# Patient Record
Sex: MALE | Race: WHITE | Employment: UNEMPLOYED | URBAN - NONMETROPOLITAN AREA
[De-identification: names, ages, dates, MRNs, and addresses within clinical notes are randomized per-mention and may not be internally consistent; named-entity substitution may affect disease eponyms.]

---

## 2017-08-02 ENCOUNTER — HOSPITAL ENCOUNTER (INPATIENT)
Age: 22
LOS: 5 days | Discharge: HOME | DRG: 751 | End: 2017-08-07
Attending: PSYCHIATRY & NEUROLOGY | Admitting: PSYCHIATRY & NEUROLOGY
Payer: MEDICAID

## 2017-08-02 DIAGNOSIS — R45.851 SUICIDAL IDEATION: Primary | ICD-10-CM

## 2017-08-02 LAB
ACETAMINOPHEN LEVEL: < 5 UG/ML (ref 0–20)
ALBUMIN SERPL-MCNC: 4.5 G/DL (ref 3.5–5.1)
ALP BLD-CCNC: 48 U/L (ref 38–126)
ALT SERPL-CCNC: 10 U/L (ref 11–66)
AMPHETAMINE+METHAMPHETAMINE URINE SCREEN: NEGATIVE
ANION GAP SERPL CALCULATED.3IONS-SCNC: 12 MEQ/L (ref 8–16)
AST SERPL-CCNC: 17 U/L (ref 5–40)
BACTERIA: NORMAL
BARBITURATE QUANTITATIVE URINE: NEGATIVE
BASOPHILS # BLD: 0.4 %
BASOPHILS ABSOLUTE: 0 THOU/MM3 (ref 0–0.1)
BENZODIAZEPINE QUANTITATIVE URINE: NEGATIVE
BILIRUB SERPL-MCNC: 0.4 MG/DL (ref 0.3–1.2)
BILIRUBIN DIRECT: < 0.2 MG/DL (ref 0–0.3)
BILIRUBIN URINE: NEGATIVE
BLOOD, URINE: NEGATIVE
BUN BLDV-MCNC: 11 MG/DL (ref 7–22)
CALCIUM SERPL-MCNC: 9.5 MG/DL (ref 8.5–10.5)
CANNABINOID QUANTITATIVE URINE: NORMAL
CASTS: NORMAL /LPF
CASTS: NORMAL /LPF
CHARACTER, URINE: CLEAR
CHLORIDE BLD-SCNC: 104 MEQ/L (ref 98–111)
CO2: 26 MEQ/L (ref 23–33)
COCAINE METABOLITE QUANTITATIVE URINE: NEGATIVE
COLOR: YELLOW
CREAT SERPL-MCNC: 0.9 MG/DL (ref 0.4–1.2)
CRYSTALS: NORMAL
EOSINOPHIL # BLD: 0.5 %
EOSINOPHILS ABSOLUTE: 0 THOU/MM3 (ref 0–0.4)
EPITHELIAL CELLS, UA: NORMAL /HPF
ETHYL ALCOHOL, SERUM: < 0.01 %
GFR SERPL CREATININE-BSD FRML MDRD: > 90 ML/MIN/1.73M2
GLUCOSE BLD-MCNC: 99 MG/DL (ref 70–108)
GLUCOSE, URINE: NEGATIVE MG/DL
HCT VFR BLD CALC: 43.8 % (ref 42–52)
HEMOGLOBIN: 15.1 GM/DL (ref 14–18)
KETONES, URINE: NEGATIVE
LEUKOCYTE ESTERASE, URINE: NEGATIVE
LYMPHOCYTES # BLD: 23.9 %
LYMPHOCYTES ABSOLUTE: 1.9 THOU/MM3 (ref 1–4.8)
MCH RBC QN AUTO: 29.4 PG (ref 27–31)
MCHC RBC AUTO-ENTMCNC: 34.4 GM/DL (ref 33–37)
MCV RBC AUTO: 85.7 FL (ref 80–94)
MISCELLANEOUS LAB TEST RESULT: NORMAL
MONOCYTES # BLD: 7.3 %
MONOCYTES ABSOLUTE: 0.6 THOU/MM3 (ref 0.4–1.3)
NITRITE, URINE: NEGATIVE
NUCLEATED RED BLOOD CELLS: 0 /100 WBC
OPIATES, URINE: NEGATIVE
OSMOLALITY CALCULATION: 282.5 MOSMOL/KG (ref 275–300)
OXYCODONE: NEGATIVE
PDW BLD-RTO: 14.1 % (ref 11.5–14.5)
PH UA: 5.5
PHENCYCLIDINE QUANTITATIVE URINE: NEGATIVE
PLATELET # BLD: 187 THOU/MM3 (ref 130–400)
PMV BLD AUTO: 8.8 MCM (ref 7.4–10.4)
POTASSIUM SERPL-SCNC: 4 MEQ/L (ref 3.5–5.2)
PROTEIN UA: NEGATIVE MG/DL
RBC # BLD: 5.12 MILL/MM3 (ref 4.7–6.1)
RBC # BLD: NORMAL 10*6/UL
RBC URINE: NORMAL /HPF
RENAL EPITHELIAL, UA: NORMAL
SALICYLATE, SERUM: < 0.3 MG/DL (ref 2–10)
SEG NEUTROPHILS: 67.9 %
SEGMENTED NEUTROPHILS ABSOLUTE COUNT: 5.4 THOU/MM3 (ref 1.8–7.7)
SODIUM BLD-SCNC: 142 MEQ/L (ref 135–145)
SPECIFIC GRAVITY UA: 1.02 (ref 1–1.03)
TOTAL PROTEIN: 6.9 G/DL (ref 6.1–8)
TSH SERPL DL<=0.05 MIU/L-ACNC: 1.75 UIU/ML (ref 0.4–4.2)
UROBILINOGEN, URINE: 0.2 EU/DL
WBC # BLD: 8 THOU/MM3 (ref 4.8–10.8)
WBC UA: NORMAL /HPF
YEAST: NORMAL

## 2017-08-02 PROCEDURE — G0480 DRUG TEST DEF 1-7 CLASSES: HCPCS

## 2017-08-02 PROCEDURE — 80053 COMPREHEN METABOLIC PANEL: CPT

## 2017-08-02 PROCEDURE — 84443 ASSAY THYROID STIM HORMONE: CPT

## 2017-08-02 PROCEDURE — 6370000000 HC RX 637 (ALT 250 FOR IP): Performed by: PSYCHIATRY & NEUROLOGY

## 2017-08-02 PROCEDURE — 81001 URINALYSIS AUTO W/SCOPE: CPT

## 2017-08-02 PROCEDURE — 99285 EMERGENCY DEPT VISIT HI MDM: CPT

## 2017-08-02 PROCEDURE — 82248 BILIRUBIN DIRECT: CPT

## 2017-08-02 PROCEDURE — 85025 COMPLETE CBC W/AUTO DIFF WBC: CPT

## 2017-08-02 PROCEDURE — 80320 DRUG SCREEN QUANTALCOHOLS: CPT

## 2017-08-02 PROCEDURE — 80305 DRUG TEST PRSMV DIR OPT OBS: CPT

## 2017-08-02 PROCEDURE — 1240000000 HC EMOTIONAL WELLNESS R&B

## 2017-08-02 PROCEDURE — 80329 ANALGESICS NON-OPIOID 1 OR 2: CPT

## 2017-08-02 PROCEDURE — 36415 COLL VENOUS BLD VENIPUNCTURE: CPT

## 2017-08-02 RX ORDER — NICOTINE 21 MG/24HR
1 PATCH, TRANSDERMAL 24 HOURS TRANSDERMAL EVERY 24 HOURS
Status: DISCONTINUED | OUTPATIENT
Start: 2017-08-02 | End: 2017-08-07 | Stop reason: HOSPADM

## 2017-08-02 RX ORDER — MAGNESIUM HYDROXIDE/ALUMINUM HYDROXICE/SIMETHICONE 120; 1200; 1200 MG/30ML; MG/30ML; MG/30ML
30 SUSPENSION ORAL PRN
Status: DISCONTINUED | OUTPATIENT
Start: 2017-08-02 | End: 2017-08-07 | Stop reason: HOSPADM

## 2017-08-02 RX ORDER — ACETAMINOPHEN 325 MG/1
650 TABLET ORAL EVERY 4 HOURS PRN
Status: DISCONTINUED | OUTPATIENT
Start: 2017-08-02 | End: 2017-08-07 | Stop reason: HOSPADM

## 2017-08-02 RX ORDER — HYDROXYZINE PAMOATE 25 MG/1
25 CAPSULE ORAL 3 TIMES DAILY PRN
Status: DISCONTINUED | OUTPATIENT
Start: 2017-08-02 | End: 2017-08-07 | Stop reason: HOSPADM

## 2017-08-02 RX ORDER — TRAZODONE HYDROCHLORIDE 50 MG/1
50 TABLET ORAL NIGHTLY PRN
Status: DISCONTINUED | OUTPATIENT
Start: 2017-08-02 | End: 2017-08-07 | Stop reason: HOSPADM

## 2017-08-02 ASSESSMENT — PAIN SCALES - GENERAL
PAINLEVEL_OUTOF10: 0
PAINLEVEL_OUTOF10: 0

## 2017-08-02 ASSESSMENT — ENCOUNTER SYMPTOMS
CHEST TIGHTNESS: 0
NAUSEA: 0
SHORTNESS OF BREATH: 0
ABDOMINAL PAIN: 0
VOMITING: 0

## 2017-08-02 ASSESSMENT — SLEEP AND FATIGUE QUESTIONNAIRES
DO YOU USE A SLEEP AID: NO
AVERAGE NUMBER OF SLEEP HOURS: 8
DO YOU HAVE DIFFICULTY SLEEPING: NO

## 2017-08-02 ASSESSMENT — LIFESTYLE VARIABLES: HISTORY_ALCOHOL_USE: NO

## 2017-08-02 NOTE — ED PROVIDER NOTES
[facility] 4E      CHIEF COMPLAINT       Chief Complaint   Patient presents with    Mental Health Problem       Nurses Notes reviewed and I agree except as noted in the HPI. HISTORY OF PRESENT ILLNESS    Maddie Batres is a 23 y.o. male who presents to the ED for suicidal ideation. Keily Marte is a student at [employer]. He [place] [facility] [place] His girlfriend of just over a year attends the same college. He reports that he has trust issues with their relationship and he has been upset that she has been speaking with another male student. Today he locked himself in his dorm room. He was thinking about hanging himself. He states he was going to hang himself with the cord of his bench grinder, attaching it to his bunk bed. His girlfriend unlocked his door and alerted campus security of his thoughts and the EMS was subsequently alerted. REVIEW OF SYSTEMS     Review of Systems   Constitutional: Negative for activity change, appetite change, fatigue, fever and unexpected weight change. Respiratory: Negative for chest tightness and shortness of breath. Cardiovascular: Negative for chest pain. Gastrointestinal: Negative for abdominal pain, nausea and vomiting. Genitourinary: Negative for dysuria. Allergic/Immunologic: Negative for immunocompromised state. Neurological: Negative for dizziness, light-headedness, numbness and headaches. Hematological: Does not bruise/bleed easily. Psychiatric/Behavioral: Positive for sleep disturbance (states only has been sleeping 3-4 hours a night. States racing thoughts at night) and suicidal ideas. Negative for agitation, behavioral problems, confusion and self-injury. The patient is not nervous/anxious. Suicidal thoughts related to relationship issues. States has attempted suicide once when he was 15. All other systems reviewed and are negative. PAST MEDICAL HISTORY    has no past medical history on file.     SURGICAL HISTORY has no past surgical history on file. CURRENT MEDICATIONS       There are no discharge medications for this patient. ALLERGIES     has No Known Allergies. FAMILY HISTORY     indicated that his mother is alive. He indicated that his father is alive. He indicated that his brother is alive. family history is not on file. SOCIAL HISTORY      reports that he has been smoking. He has a 9.00 pack-year smoking history. He does not have any smokeless tobacco history on file. He reports that he uses illicit drugs, including Marijuana. He reports that he does not drink alcohol. PHYSICAL EXAM     INITIAL VITALS:  height is 6' 1\" (1.854 m) and weight is 150 lb (68 kg). His temperature is 98.3 °F (36.8 °C). His blood pressure is 125/69 and his pulse is 78. His oxygen saturation is 96%. Physical Exam   Constitutional: He is oriented to person, place, and time. He appears well-developed and well-nourished. No distress. HENT:   Head: Normocephalic and atraumatic. Cardiovascular: Normal rate, regular rhythm, normal heart sounds and intact distal pulses. No murmur heard. Pulmonary/Chest: Effort normal. No respiratory distress. He has no wheezes. He has no rales. Abdominal: Soft. There is no tenderness. Musculoskeletal: Normal range of motion. Neurological: He is alert and oriented to person, place, and time. He has normal strength. Coordination normal.   Skin: Skin is warm and dry. He is not diaphoretic. Psychiatric: He has a normal mood and affect. His speech is normal and behavior is normal. Thought content is not paranoid and not delusional. Cognition and memory are normal. He expresses impulsivity. He expresses suicidal ideation. He expresses no homicidal ideation. He expresses no suicidal plans and no homicidal plans. States had thoughts of suicide prior to arrival. States he realizes there are other options than suicide for his emotional issues.    Nursing note and vitals reviewed. DIFFERENTIAL DIAGNOSIS:   Suicidal ideation, depressive disorder, situational reaction    DIAGNOSTIC RESULTS       RADIOLOGY: non-plain film images(s) such as CT, Ultrasound and MRI are read by the radiologist.  Plain radiographic images are visualized and preliminarily interpreted by the emergency physician unless otherwise stated below.   No orders to display       LABS:   Results for orders placed or performed during the hospital encounter of 08/02/17   Acetaminophen Level   Result Value Ref Range    Acetaminophen Level < 5.0 0.0 - 20.0 ug/mL   Basic Metabolic Panel   Result Value Ref Range    Sodium 142 135 - 145 meq/L    Potassium 4.0 3.5 - 5.2 meq/L    Chloride 104 98 - 111 meq/L    CO2 26 23 - 33 meq/L    Glucose 99 70 - 108 mg/dL    BUN 11 7 - 22 mg/dL    CREATININE 0.9 0.4 - 1.2 mg/dL    Calcium 9.5 8.5 - 10.5 mg/dL   CBC Auto Differential   Result Value Ref Range    WBC 8.0 4.8 - 10.8 thou/mm3    RBC 5.12 4.70 - 6.10 mill/mm3    Hemoglobin 15.1 14.0 - 18.0 gm/dl    Hematocrit 43.8 42.0 - 52.0 %    MCV 85.7 80.0 - 94.0 fL    MCH 29.4 27.0 - 31.0 pg    MCHC 34.4 33.0 - 37.0 gm/dl    RDW 14.1 11.5 - 14.5 %    Platelets 247 046 - 488 thou/mm3    MPV 8.8 7.4 - 10.4 mcm    RBC Morphology NORMAL     Seg Neutrophils 67.9 %    Lymphocytes 23.9 %    Monocytes 7.3 %    Eosinophils 0.5 %    Basophils 0.4 %    nRBC 0 /100 wbc    Segs Absolute 5.4 1.8 - 7.7 thou/mm3    Lymphocytes # 1.9 1.0 - 4.8 thou/mm3    Monocytes # 0.6 0.4 - 1.3 thou/mm3    Eosinophils # 0.0 0.0 - 0.4 thou/mm3    Basophils # 0.0 0.0 - 0.1 thou/mm3   Ethanol   Result Value Ref Range    ETHYL ALCOHOL, SERUM < 0.01 0.00 %   Urinalysis with Microscopic   Result Value Ref Range    Glucose, Urine NEGATIVE NEGATIVE mg/dl    Bilirubin Urine NEGATIVE NEGATIVE    Ketones, Urine NEGATIVE NEGATIVE    Specific Gravity, UA 1.024 1.002 - 1.03    Blood, Urine NEGATIVE NEGATIVE    pH, UA 5.5 5.0 - 9.0    Protein, UA NEGATIVE NEGATIVE mg/dl Urobilinogen, Urine 0.2 0.0 - 1.0 eu/dl    Nitrite, Urine NEGATIVE NEGATIVE    Leukocyte Esterase, Urine NEGATIVE NEGATIVE    Color, UA YELLOW YELLOW-STR    Character, Urine CLEAR CLR-SL.SHERRY    RBC, UA 0-2 0-2/hpf /hpf    WBC, UA 0-2 0-4/hpf /hpf    Epi Cells 0-2 3-5/hpf /hpf    Bacteria, UA NONE FEW/NONE S    Casts NONE SEEN NONE SEEN /lpf    Crystals NONE SEEN NONE SEEN    Renal Epithelial, Urine NONE SEEN NONE SEEN    Yeast, UA NONE SEEN NONE SEEN    Casts NONE SEEN /lpf    Miscellaneous Lab Test Result NONE SEEN    TSH without Reflex   Result Value Ref Range    TSH 1.750 0.400 - 3.34 uIU/mL   Salicylate   Result Value Ref Range    Salicylate, Serum < 0.3 (L) 2.0 - 10.0 mg/dL   Hepatic Function Panel   Result Value Ref Range    Alb 4.5 3.5 - 5.1 g/dL    Total Bilirubin 0.4 0.3 - 1.2 mg/dL    Bilirubin, Direct <0.2 0.0 - 0.3 mg/dL    Alkaline Phosphatase 48 38 - 126 U/L    AST 17 5 - 40 U/L    ALT 10 (L) 11 - 66 U/L    Total Protein 6.9 6.1 - 8.0 g/dL   Rapid drug screen, urine   Result Value Ref Range    AMPHETAMINE+METHAMPHETAMINE URINE SCREEN negative NEGATIVE    Barbiturate Quant, Ur negative NEGATIVE    Benzodiazepine Quant, Ur negative NEGATIVE    Cannabinoid Quant, Ur **POSITIVE** NEGATIVE    Cocaine Metab Quant, Ur negative NEGATIVE    Opiates, Urine negative NEGATIVE    PCP Quant, Ur negative NEGATIVE   Anion Gap   Result Value Ref Range    Anion Gap 12.0 8.0 - 16.0 meq/L   Glomerular Filtration Rate, Estimated   Result Value Ref Range    Est, Glom Filt Rate >90 ml/min/1.73m2   Osmolality   Result Value Ref Range    Osmolality Calc 282.5 275.0 - 300 mOsmol/kg       EMERGENCY DEPARTMENT COURSE:   Vitals:    Vitals:    08/02/17 1426   BP: 125/69   Pulse: 78   Temp: 98.3 °F (36.8 °C)   SpO2: 96%   Weight: 150 lb (68 kg)   Height: 6' 1\" (1.854 m)       MDM  The patient was evaluated per [employer] staff.  The case was discussed with psychiatry who decided to admit the patient for inpatient psychiatric treatment. Medications Given in the ED  Medications   acetaminophen (TYLENOL) tablet 650 mg (not administered)   hydrOXYzine (VISTARIL) capsule 25 mg (not administered)   traZODone (DESYREL) tablet 50 mg (not administered)   magnesium hydroxide (MILK OF MAGNESIA) 400 MG/5ML suspension 30 mL (not administered)   aluminum & magnesium hydroxide-simethicone (MAALOX) 200-200-20 MG/5ML suspension 30 mL (not administered)       CRITICAL CARE[de-identified]   NONE    CONSULTS:  LIYAH    PROCEDURES:  None    FINAL IMPRESSION      1.  Suicidal ideation          DISPOSITION/PLAN   Decision To Admit      (Please note that portions of this note were completed with a voice recognition program.  Efforts were made to edit the dictations but occasionally words are mis-transcribed.)      Osmar Couch PA-C 08/02/17 4:46 PM      Benyj Crump, [address] [place] [address]  08/02/17 5418

## 2017-08-02 NOTE — IP AVS SNAPSHOT
After Visit Summary    This summary was created for you. Thank you for entrusting your care to us. The following information includes details about your hospital/visit stay along with steps you should take to help with your recovery once you leave the hospital.  In this packet, you will find information about the topics listed below:    · Instructions about your medications including a list of your home medications  · A summary of your hospital visit  · Follow-up appointments once you have left the hospital  · Your care plan at home      You may receive a survey regarding the care you received during your stay. Your input is valuable to us. We encourage you to complete and return your survey in the envelope provided. We hope you will choose us in the future for your healthcare needs. Basic Information     Date Of Birth Sex Race Ethnicity       1995 Male White Non-/Non        Vital Signs     Blood Pressure Pulse Temperature Respirations Height Weight    135/63 78 96.9 °F (36.1 °C) (Oral) 20 6' 1\" (1.854 m) 149 lb (67.6 kg)    Oxygen Saturation Body Mass Index Smoking Status             98% 19.66 kg/m2 Current Every Day Smoker         Care Provided at:     Name Address Phone       [address] [phone]       Psychiatric Advance Directive          Most Recent Value    Psychiatric Advance Directive  No    Power of  for Alexis Salmeron  none           Your Visit    Here you will find information about your visit, including the reason for your visit. Please take this sheet with you when you visit your doctor or other health care provider in the future. It will help determine the best possible medical care for you at that time. If you have any questions once you leave the hospital, please call the department phone number listed below.         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 Phone    8/2/2017 Tr Bryan MD [facility] 4E [phone]       Follow-up Appointments    Below is a list of your follow-up and future appointments. This may not be a complete list as you may have made appointments directly with providers that we are not aware of or your providers may have made some for you. Please call your providers to confirm appointments. It is important to keep your appointments. Please bring your current insurance card, photo ID, co-pay, and all medication bottles to your appointment. If self-pay, payment is expected at the time of service. Follow-up Information     Schedule an appointment as soon as possible for a visit [facility]  [facility] .    Why:  Mike Kimberly is to call to schedule follow up with the [address] [place] [address]. Contact information:    Location:  [facility]  Hours:  Monday - Friday, 9:00 a.m. - 5:00 p.m. Contact Information:  Emigdio Troy Psy.D. - Director of [address] [place] [address]  [phone]  [email or web address]             Care Plan Once You Return Home    This section includes instructions you will need to follow once you leave the hospital.  Your care team will discuss these with you, so you and those caring for you know how to best care for your health needs at home. This section may also include educational information about certain health topics that may be of help to you. Activity Instructions     As tolerated             Discharge Instructions       Keep all follow-up appointments and take medications as directed. Call the hope line if needed at :  Sangita Flores, and .S. formerly [facility] [phone]. Honey Iqbal [phone]. [facility] [phone]. [address] [place] [phone]. Salud Swartz, and Og [phone]    Symptoms to report to your Doctor:  Depression  Inability to eat, sleep, or have a bowel movement  Increased sleepiness and lethargy  Voices in your head  Any thoughts of harming self or others    Things to avoid:  Caffeine  Alcohol  No street drugs  Over the counter medications unless Ok'd by your physician or pharmacist.  Driving or operating machinery until full effects of your medications are known. Driving or operating machinery if dizzy or drowsy from medications. Use journal as directed. Education:  Illness and medication teaching was completed. Discharge Disposition: Patient was discharged to dorm apartment and was transported by private. Patient was accompanied by girlfriend. Information sent to next level of care:    ____Admission orders to [facility] [address]    ____Discharge instructions    ____Behavioral Services Assessment    ____Hand off Summary    ____History and Physical    ____Last dose MAR    ____Patient transfer form    ____Other           Important Information    If your condition worsens or if you have any concerns, call your doctor or seek emergency medical services (dial 9-1-1) as needed. If you have any of the following symptoms/conditions, call your doctor. Call your primary care physician to obtain results of outstanding lab tests, cultures, x-rays, or other tests. Important Information if you smoke or are exposed to smoking       SMOKING: QUIT SMOKING. THIS IS THE MOST IMPORTANT ACTION YOU CAN TAKE TO IMPROVE YOUR CURRENT AND FUTURE HEALTH. Call the [facility] NOW ([phone])    Smoking harms nonsmokers. When nonsmokers are around people who smoke, they absorb nicotine, carbon monoxide, and other ingredients of tobacco smoke.      DO NOT SMOKE AROUND CHILDREN Immunizations as of 8/7/2017  Reviewed on 8/2/2017    No immunizations on file. MyChart Signup     [facility] allows you to send messages to your doctor, view your test results, renew your prescriptions, schedule appointments, view visit notes, and more. How Do I Sign Up? 1. In your Internet browser, go to [email or web address]  2. Click on the Sign Up Now link in the Sign In box. You will see the New Member Sign Up page. 3. Enter your [facility] Access Code exactly as it appears below. You will not need to use this code after youve completed the sign-up process. If you do not sign up before the expiration date, you must request a new code. [facility] Access Code: [email or web address]  Expires: 10/6/2017  1:56 PM    4. Enter your Social Security Number ([id number]) and Date of Birth (mm/dd/yyyy) as indicated and click Submit. You will be taken to the next sign-up page. 5. Create a [facility] ID. This will be your [facility] login ID and cannot be changed, so think of one that is secure and easy to remember. 6. Create a [facility] password. You can change your password at any time. 7. Enter your Password Reset Question and Answer. This can be used at a later time if you forget your password. 8. Enter your e-mail address. You will receive e-mail notification when new information is available in [address] 9. Click Sign Up. You can now view your medical record. Additional Information  If you have questions, please contact the physician practice where you receive care. Remember, [facility] is NOT to be used for urgent needs. For medical emergencies, dial 911. For questions regarding your [facility] account call [phone]. If you have a clinical question, please call your doctor's office. View your information online  ? Review your current list of  medications, immunization, and allergies. ? Review your future test results online . ? Review your discharge instructions provided by your caregivers at discharge    Certain functionality such as prescription refills, scheduling appointments or sending messages to your provider are not activated if your provider does not use [facility] in his/her office    For questions regarding your [facility] account call [phone]. If you have a clinical question, please call your doctor's office.

## 2017-08-02 NOTE — PROGRESS NOTES
[address] [facility]  Admission Note     Admission Type:   Admission Type: Involuntary    Reason for admission:  Reason for Admission: MDD    PATIENT STRENGTHS:  Strengths: Communication, Social Skills, No significant Physical Illness    Patient Strengths and Limitations:  Limitations: Unrealistic self-view, Apathetic / unmotivated    Addictive Behavior:   Addictive Behavior  In the past 3 months, have you felt or has someone told you that you have a problem with:  : None  Do you have a history of Chemical Use?: No  Do you have a history of Alcohol Use?: No  Do you have a history of Street Drug Abuse?: Yes  Histroy of Prescripton Drug Abuse?: No    Medical Problems:   History reviewed. No pertinent past medical history. Status EXAM:  Status and Exam  Normal: No  Facial Expression: Exaggerated, Worried  Affect: Unstable  Level of Consciousness: Alert  Mood:Normal: No  Mood: Anxious, Suspicious, Angry  Motor Activity:Normal: No  Motor Activity: Increased  Interview Behavior: Cooperative, Evasive, Irritable  Preception: [place] to Person, Myrla Lighter to Time, [place] to Place, [place] to Situation  Attention:Normal: No  Attention: Unable to Concentrate  Thought Processes: Tangential  Thought Content:Normal: Yes  Hallucinations: None  Delusions: No  Memory:Normal: Yes  Insight and Judgment: No  Insight and Judgment: Poor Judgment, Poor Insight, Unrealistic  Present Suicidal Ideation: No  Present Homicidal Ideation: No    Pt admitted with followings belongings:  Dentures: None  Vision - Corrective Lenses: None  Hearing Aid: None  Jewelry: None  Body Piercings Removed: N/A  Clothing: Footwear, Pants, Shirt  Were All Patient Medications Collected?: Not Applicable  Other Valuables: Cell phone, Pamela Salon, Money (Comment), Other (Comment) (cigarettes, lighter)     Valuables sent home with N/A. Valuables placed in safe in security envelope, number:  [id number]. Patient's home medications were N/A.   Patient oriented to surroundings and program expectations and copy of patient rights given. Received admission packet:  YES. Consents reviewed, signed YES. Refused NO. Patient verbalize understanding:  YES. Patient education on precautions: N/A           Provided pt with [employer] handout entitled \"Quitting Smoking. \"  Reviewed handout with pt addressing dangers of smoking, developing coping skills, and providing basic information about quitting. Pt response to counseling:  Not interested in quitting at this time          Doni Johnson RN       Patient came in by ambulance to the ED. Had conversation with girlfriend about pat in her class, upset and locked door to his bedroom. (police state that he barricaded himself in) Yelled that he wanted to die /give up and  heard him and called police. Patient states that he had no intention to hurt himself but he does have occasional thoughts. He is feeling stress from school and moving to [place]. He was tearful during assessment. Sleeps well at night, eating well, uses marijuana 4x/week. Has finals this week and is hoping for discharge.
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
## 2017-08-02 NOTE — Clinical Note
Patient Class: Inpatient [101]   REQUIRED: Diagnosis: Major depressive disorder with single episode [[id number]]   Estimated Length of Stay: Estimated stay of more than 2 midnights   Future Attending Provider: Ingrid Mackenzie [[id number]]   Admitting Provider: Ingrid Mackenzie [[id number]]   Telemetry Bed Required?: No

## 2017-08-02 NOTE — ED NOTES
Bed: 025A  Expected date: 8/2/17  Expected time:   Means of arrival:   Comments:  [address]  08/02/17 2384

## 2017-08-02 NOTE — ED NOTES
1415 Received notification that pt is high risk for harm to self or others. Attending ED provider assigned. Escorted pt to safe room area. Placed personal belongings in bag with pt present. Secured bag outside of safe room in locker. Pt escorted to bathroom . With bathroom door open, providing privacy as much as able, pt undressed to only underpants. Visual check/skin assessment performed. Pt given hospital attire with snaps. Clothing secured in bag outside of safe room. Pt belonging list completed via EPIC.  1425 Completed Broset tool with score of 0. Assessment of pt determines pt to currently be at high risk due to suicidal ideation with plan. .  See [facility] Purposeful Rounds sheet for 15 minute documentation. 1425 Pt seen by attending ED provider. [address] Call with Dr Stan Chowdhury. Admission orders received.               Ken Hamilton, RN  08/02/17 5292

## 2017-08-03 PROCEDURE — 1240000000 HC EMOTIONAL WELLNESS R&B

## 2017-08-03 PROCEDURE — 90792 PSYCH DIAG EVAL W/MED SRVCS: CPT | Performed by: PSYCHIATRY & NEUROLOGY

## 2017-08-03 PROCEDURE — 6370000000 HC RX 637 (ALT 250 FOR IP): Performed by: PSYCHIATRY & NEUROLOGY

## 2017-08-03 NOTE — BH NOTE
Group Therapy Note    Date: 8/3/2017  Start Time:  1100  End Time:   1200  Number of Participants: 9    Type of Group: Recreational/Social      Patient's Goal:   Increase concentration/socialization. Notes:   Social with others. Anxious to talk to the doctor about being discharged.     Status After Intervention:  Unchanged    Participation Level: Interactive    Participation Quality: Attentive and Sharing      Speech:  normal      Thought Process/Content: Logical      Affective Functioning: Congruent      Mood: euthymic      Level of consciousness:  Oriented x4      Response to Learning: Able to verbalize current knowledge/experience      Endings: None Reported    Modes of Intervention: Socialization and Activity      Discipline Responsible: Psychoeducational Specialist      Signature:  Elvis Severance

## 2017-08-03 NOTE — PROGRESS NOTES
Group Therapy Note    Date: 8/3/2017  Start Time: 1330  End Time:  1430  Number of Participants: 11    Type of Group: Cognitive skills group    Wellness Binder Information  Module Name:  Relationship group  Session Number: NA      Notes: Pt is present for group and continues with active participation. Relates well with his peers and demonstrates insight. Discussion around denial and uncovering thinking errors which contribute to minimization of life issues. Identification of daily practice of honesty, open mindedness and willingness and how those three skills support recovery. Status After Intervention:  Improved    Participation Level:  Active Listener and Interactive    Participation Quality: Appropriate, Attentive, Sharing and Supportive      Speech:  normal      Thought Process/Content: Logical  Linear      Affective Functioning: Congruent      Mood: euthymic      Level of consciousness:  Alert, Oriented x4 and Attentive      Response to Learning: Able to verbalize current knowledge/experience, Able to verbalize/acknowledge new learning, Able to retain information, Capable of insight, Able to change behavior and Progressing to goal      Endings: None Reported    Modes of Intervention: Education, Support, Socialization, Exploration, Clarifying, Problem-solving and Activity      Discipline Responsible: /Counselor      Signature:  MARIPOSA Jackson

## 2017-08-03 NOTE — PROGRESS NOTES
This RN has reviewed and agrees with Mary De La Torre LPN's data collection and has collaborated with this LPN regarding the patient's care plan.
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
## 2017-08-03 NOTE — BH NOTE
INPATIENT RECREATIONAL THERAPY  ADULT BEHAVIORAL SERVICES  EVALUATION    REFERRING PHYSICIAN:   Dr. Keo Rivera  DIAGNOSIS:   Major Depressive Disorder  PRECAUTIONS:   Suicide precautions    HISTORY OF PRESENT ILLNESS/INJURY:  Patient was admitted to the unit due to suicidal ideation and depression. Patient reportedly was in his dorm room and threatened to hang himself with a cord prior to admission. Patient stated that he was jealous due to his girlfriend being friendly with another pat. Patient also reported stress due to his classes. Patient stated that he has finals this week and needs discharged. Patient was pleasant and cooperative at time of evaluation. PMH:  Please see medical chart for prior medical history, allergies, and medication    HISTORY OF PSYCHIATRIC TREATMENT:  None known but does have a history of an attempted hanging as a teen. DATE OF BIRTH:   [birth year]  GENDER:   male  MARITAL STATUS:  Patient has a girlfriend. EMPLOYMENT STATUS:   Patient is a college student at [employer]. LIVING SITUATION/SUPPORT:  Lives in the school dorms. EDUCATIONAL LEVEL:  Patient is currently a college student in the automotive [employer] program at [employer]. MEDICATION/DRUG USE:  Marijuana use. LEISURE INTERESTS:   Activities with his girlfriend, working on cars, activities with family, watching TV/Movies, activities with friends, listening to music, playing games and cards, computer activities  ACTIVITY PREFERENCE:  No preference  ACTIVITY TYPES:   Passive. Active. Indoor. Outdoor. COGNITION:  A&Ox4    COPING:  poor  ATTENTION:  fair  RELAXATION:  Reported poor sleep, anxiety and racing thoughts. SELF-ESTEEM:  fair  MOTIVATION:   good    SOCIAL SKILLS:  good  FRUSTRATION TOLERANCE:   Patient has a history of cutting.   ATTENTION SEEKING:  None noted  COOPERATION:  Cooperative and pleasant  AFFECT:  bright  APPEARANCE:  Appropriate/neat    HEARING:  No problems  VISION:  No problems  VERBAL COMMUNICATION:   No problems  WRITTEN COMMUNICATION:   No problems    COORDINATION:   No problems noted   MOBILITY:   Ambulates independently   GOALS:   Increase socialization by attending at least 3 groups on the unit daily.

## 2017-08-03 NOTE — PROGRESS NOTES
[facility]  Initial Interdisciplinary Treatment Plan NOTE    Review Date & Time: 08/03/17 6712    Patient was in treatment team    Admission Type:   Admission Type: Involuntary    Reason for admission:  Reason for Admission: MDD      Estimated Length of Stay Update:  0/05/17  Estimated Discharge Date Update: 08/07/17    PATIENT STRENGTHS:  Patient Strengths Strengths: Communication, Social Skills, No significant Physical Illness  Patient Strengths and Limitations:Limitations: Unrealistic self-view, Apathetic / unmotivated  Addictive Behavior:Addictive Behavior  In the past 3 months, have you felt or has someone told you that you have a problem with:  : None  Do you have a history of Chemical Use?: No  Do you have a history of Alcohol Use?: No  Do you have a history of Street Drug Abuse?: Yes  Histroy of Prescripton Drug Abuse?: No  Medical Problems:History reviewed. No pertinent past medical history. EDUCATION:   Learner Progress Toward Treatment Goals: Reviewed results and recommendations of this team, Reviewed group plan and strategies, Reviewed signs, symptoms and risk of self harm and violent behavior and Reviewed goals and plan of care    Method: Small group    Outcome: Verbalized understanding and Demonstrated Understanding    PATIENT GOALS: See below    PLAN/TREATMENT RECOMMENDATIONS UPDATE:Medication management, supportive therapy, discharge planning. SHORT-TERM GOALS UPDATE:   Time frame for Short-Term Goals: Daily    LONG-TERM GOALS UPDATE:   Time frame for Long-Term Goals: 08/07/17  Members Present in Team Meeting: See Signature Sheet      MARIPOSA Calhoun    1. What is the most important thing that you need help with while you are here? Improve coping skills. 2. Who is your support system? Family, friends  3. Where will you be residing when you leave the hospital? Dorm room  4. Do you have follow-up providers? Intends to follow with Counseling dept.  At [facility]

## 2017-08-03 NOTE — PLAN OF CARE
Problem: Social interaction  Goal: Increased social interaction  Outcome: Met This Shift  Patient has attended at least 3 groups this shift so he has met his socialization goal for today.

## 2017-08-03 NOTE — PLAN OF CARE
Problem: Health Behavior:  Goal: Ability to verbalize adaptive coping strategies to use when suicidal feelings occur will improve  Ability to verbalize adaptive coping strategies to use when suicidal feelings occur will improve   Outcome: Completed Date Met:  08/03/17  Denies thoughts to harm self    Problem: Safety:  Goal: Ability to contract for his/her safety will improve  Ability to contract for his/her safety will improve   Outcome: Completed Date Met:  08/03/17  Able to contract for safety    Problem: Altered Mood, Depressive Behavior  Goal: LTG-Able to verbalize acceptance of life and situations over which he or she has no control  Outcome: Ongoing  Goal: LTG-Able to verbalize and/or display a decrease in depressive symptoms  Outcome: Ongoing  Denies feeling depressed \"I said a stupid thing. Thresa Abed Thresa Abed Thresa Abed \"   Goal: STG-Able to verbalize suicidal ideations  Outcome: Completed Date Met:  08/03/17  denies  Goal: STG-Able to verbalize support system  Outcome: Completed Date Met:  08/03/17  States has family, friends and mom for support  Goal: LTG-Absence of self-harm  Outcome: Met This Shift  Goal: STG-Knowledge of positive coping patterns  Outcome: Ongoing  Coped this pm by playing cards and socializing with peers  Goal: Patient Specific Goal  Outcome: Ongoing  No goal set for the day as was admitted late in day. Goal: Participation in care planning  Outcome: Ongoing  Care plan reviewed with patient. Patient does verbalize understanding of the plan of care and did contribute to goal setting.     Problem: Falls - Risk of:  Goal: Will remain free from falls  Will remain free from falls   Outcome: Met This Shift    Problem: Discharge Planning:  Goal: Discharged to appropriate level of care  Discharged to appropriate level of care   Outcome: Ongoing    Problem: Substance Abuse  Goal: LTG-Absence of acute withdrawl symptoms  Outcome: Completed Date Met:  08/03/17  Denies symptoms of withdrawal.  Goal: STG-Knowledge of positive coping patterns  Outcome: Ongoing  Coped this pm by playing cards and socializing with peers    Problem: Anxiety:  Goal: Level of anxiety will decrease  Level of anxiety will decrease   Outcome: Completed Date Met:  08/03/17  Denies feeling anxious. Was tearful with girlfriend on phone just before going to bed.     Problem: KNOWLEDGE DEFICIT,EDUCATION,DISCHARGE PLAN  Goal: Knowledge - personal safety  Outcome: Ongoing

## 2017-08-03 NOTE — H&P
Department of Psychiatry  Attending Physician Psychiatric Assessment     CHIEF COMPLAINT:  SI    History obtained from:  patient, electronic medical record    HISTORY OF PRESENT ILLNESS:    Marcelo Francois is a 23 y.o. male, a college student who was brought in because of suicidal ideation, with intent and plan of hanging himself with a cord after argument with his GF    Pt stated that he as having conflicts with his GF as she was talking to a pat that he didn't like. He stated that he had an argument, after that he locked the door of the dorm, started yelling and screaming that he is going to kill himself. He stated that college staff called [phone], and he told 911 staff that he was thinking of hanging himself with a cord. He was pink slipped. Pt seemed to be minimizing his symptoms, he stated that what he did was \"out of anger\" and \"it was a stupid thing\" and he didn't intent to harm himself. He stated that for last few weeks he had been feeling stressed because of conflicts with his GF, stress at school and financial stress, though he denies any psychiatric symptoms including depressive, manic or anxiety symptoms, perceptual disturbances or suicidal/homicidal ideation, intent or plan. He was preoccupied with discharge, stating that he has learned \"lot of coping skills\" during his stay in the hospital. Treatment options including medications discussed with the pt, with benefits, risks and treatment alternatives in detail, he kept on refusing to take any meds and stating that he doesn't need them and will only follow with therapy. He denies any active physical complaints and is not in any apparent distress    PSYCHIATRIC HISTORY:    Pt stated that at the age of 15 yrs, he saw a therapist for one and half months for \"anger issues and depression\".  He denies any other psychiatric hx including taking any psychotropic medication, any prior  h/o psychiatric hospitalization, suciidal attempt or aggressive/homicidal behavior    Past psychiatric medications includes:     Pt denies taking any psychotropic medication    Adverse reactions from psychotropic medications:  N/A      Lifetime Psychiatric Review of Systems         Carolin or Hypomania:  Pt denies     Panic Attacks:  Pt denies     Phobias:  Pt denies     Obsessions and Compulsions:Pt denies     Body or Vocal Tics:  Pt denies     Hallucinations:  Pt denies     Delusions:  Pt denies    Past Medical History:    History reviewed. No pertinent past medical history. Past Surgical History:    History reviewed. No pertinent surgical history. Medications Prior to Admission:   No prescriptions prior to admission. Allergies:  Review of patient's allergies indicates no known allergies. Social History:  TOBACCO: smokes pack a day  ETOH:  Pt denies  DRUGS: smokes marijuana 3-4 times a wk, half joint  MARITAL STATUS: single  OCCUPATION: college student  RESIDENCE: Currently lives in college dorm        Family History:   History reviewed. No pertinent family history.     Psychiatric Family History  Pt denies any family h/o psychiatric illness      PHYSICAL EXAM:  Vitals:  /67  Pulse 83  Temp 96.7 °F (35.9 °C) (Oral)   Resp 16  Ht 6' 1\" (1.854 m)  Wt 149 lb (67.6 kg)  SpO2 98%  BMI 19.66 kg/m2    Physical exam performed in ED      MENTAL STATUS EXAM  Level of consciousness: AAOX3  Appearance:  Disheveled  Behavior/Motor:  No abnormalities noticed  Attitude toward examiner:  Partially cooperative, seems to be minimizing his symptoms  Speech:  Goal directed, linear  Mood:  \"fine\"  Affect:  Stable, full range  Thought processes: linear, goal directed  Thought content:  Denies suicidal/homicidal ideation, no evidence of suicidal/homicidal ideation or delusions found  Perceptual Disturbance:  Pt denies  Cognition:  Within normal limits  Memory: Good recent and remote memory  Insight & Judgement: partial  Medication side effects: Pt denies          DSM 5 DIAGNOSIS:    Major depressive disorder    Psychosocial and contextual factors: There is no problem list on file for this patient. TREATMENT PLAN    Risk Management:  close watch per standard protocol      Psychotherapy:  participation in milieu and group and individual sessions with Attending Physician,  and Physician Assistant/CNP    Reason for Admission to Psychiatric Unit: Pt was brought in for SI with intent and plan, he poses danger to self and others and needs inpatient hospitalization for stabilization      Estimated length of stay:  5-10 days      GENERAL PATIENT/FAMILY EDUCATION  Patient will understand basic signs and symptoms, Patient will understand benefits/risks and potential side effects from proposed meds and Patient will understand their role in recovery. Family is  active in patient's care. Patient assets that may be helpful during treatment include: Intent to participate and engage in treatment, sufficient fund of knowledge and intellect to understand and utilize treatments. Goals:    -Pt only wants to follow with therapy and refused to start any medication despite counseling in detail    -Psychoeducation/supportive therapy/group and milieu therapy      Behavioral Services  Medicare Certification Upon Admission    I certify that this patient's inpatient psychiatric hospital admission is medically necessary for:   X (1) Treatment which could reasonably be expected to improve this patient's condition,      X (2) Or for diagnostic study;     AND     X (2) The inpatient psychiatric services are provided while the individual is under the care of a physician and are included in the individualized plan of care. Estimated length of stay/service 5-7 days    Plan for post-hospital care : Psychiatry follow up after discharge       Physicians Signature:  Electronically signed by Renay Moya.  Mack Delgado MD, on 8/3/2017 at 12:29 PM

## 2017-08-03 NOTE — PROGRESS NOTES
Group Therapy Note    Date: 8/3/2017  Start Time: 1000  End Time:  1100  Number of Participants: 9    Type of Group: Psychotherapy    Wellness Binder Information  Module Name:  Relationship group  Session Number: NA      Notes: Pt is present for group  with active participation. Relates well with his peers. Pt is new to the unit and shared the events related to this admission. Supportive of peers. Status After Intervention:  Improved    Participation Level:  Active Listener and Interactive, monopolizing    Participation Quality: Appropriate, Attentive, Sharing and Supportive      Speech:  Rapid Pressured      Thought Process/Content: Logical  Linear      Affective Functioning: Congruent      Mood: Euphoric      Level of consciousness:  Alert, Oriented x4 and Attentive      Response to Learning: Able to verbalize current knowledge/experience, Able to verbalize/acknowledge new learning, Able to retain information, Capable of insight, Able to change behavior and Progressing to goal      Endings: None Reported    Modes of Intervention: Education, Support, Socialization, Exploration, Clarifying, Problem-solving and Activity      Discipline Responsible: /Counselor      Signature:  MARIPOSA Khanna

## 2017-08-03 NOTE — PROGRESS NOTES
Case management 1300-Assisted pt in placing a call to the [facility] so that he could advise them of he being hospitalized.

## 2017-08-04 PROCEDURE — 1240000000 HC EMOTIONAL WELLNESS R&B

## 2017-08-04 PROCEDURE — 6370000000 HC RX 637 (ALT 250 FOR IP): Performed by: PSYCHIATRY & NEUROLOGY

## 2017-08-04 PROCEDURE — 99232 SBSQ HOSP IP/OBS MODERATE 35: CPT | Performed by: PSYCHIATRY & NEUROLOGY

## 2017-08-04 RX ADMIN — MAGNESIUM HYDROXIDE 30 ML: 400 SUSPENSION ORAL at 13:26

## 2017-08-04 ASSESSMENT — PAIN SCALES - GENERAL: PAINLEVEL_OUTOF10: 0

## 2017-08-04 NOTE — PROGRESS NOTES
Department of Psychiatry  Attending Physician Progress Note    Chief Complaint: SI    SUBJECTIVE:  Pt stated that he is \"doing much better\". He stated that he has been attanding groups and they are helping him a lot. Option of meds discussed and he still doesn't want to take any medication. He stated that his GF visited him yesterday and brought his books and he is studying for the exam. He showed me his books as well. He denies any depressive, manic or anxiety symptoms, perceptual disturbances or suicidal/homicidal ideation, intent or plan.  He denies any active physical complaints and is not in any apparent distress    OBJECTIVE    Physical  VITALS:    BP (!) 125/56  Pulse 84  Temp 96.7 °F (35.9 °C) (Tympanic)   Resp 16  Ht 6' 1\" (1.854 m)  Wt 149 lb (67.6 kg)  SpO2 98%  BMI 19.66 kg/m2    Mental Status Examination:    Level of consciousness:  Within normal limits  Appearance: Street clothes, seated, with good grooming  Behavior/Motor: No abnormalities noted  Attitude toward examiner:  Cooperative, attentive, good eye contact  Speech:  spontaneous, normal rate, normal volume and well articulated  Mood:  \"fine\"  Affect:  Full range, stable  Thought processes:  linear, goal directed and coherent  Thought content:  denies homicidal ideation  Suicidal Ideation:  denies suicidal ideation  Delusions:  no evidence of delusions  Perceptual Disturbance:  denies any perceptual disturbance  Cognition:  In tact  Memory: age appropriate  Insight & Judgement: fair  Medication side effects:  denies     Medications  Current Facility-Administered Medications: acetaminophen (TYLENOL) tablet 650 mg, 650 mg, Oral, Q4H PRN  hydrOXYzine (VISTARIL) capsule 25 mg, 25 mg, Oral, TID PRN  traZODone (DESYREL) tablet 50 mg, 50 mg, Oral, Nightly PRN  magnesium hydroxide (MILK OF MAGNESIA) 400 MG/5ML suspension 30 mL, 30 mL, Oral, Daily PRN  aluminum & magnesium hydroxide-simethicone (MAALOX) 200-200-20 MG/5ML suspension 30 mL, 30 mL, Oral, PRN  nicotine (NICODERM CQ) 21 MG/24HR 1 patch, 1 patch, Transdermal, Q24H    ASSESSMENT     Pt is showing some improvement though he still needs inpatient hospitalization for full recovery    PLAN    -Pt doesn't want any medication, despite counseling and explaining risks and benefits, though he is agreeable to therapy and has been receiving it.    -Supportive/group/milieu therapy.  Psychoeduaction      Electronically Signed by Waylon Boyle MD, 8/4/2017 10:57 AM

## 2017-08-04 NOTE — PROGRESS NOTES
This RN has reviewed and agrees with Diamante Roche LPN's data collection and has collaborated with this LPN regarding the patient's care plan.

## 2017-08-04 NOTE — BH NOTE
Patient wanted his valuables envelope to get some money out of it for his girlfriend. Patient signed a release for valuable envelope # [id number] to be picked up. Envelope was picked up and opened in front of patient. He took out $90 and gave it to his girlfriend. He confirmed with this nurse that he has $1,000.00 in his wallet. Money was recounted with this nurse and Wander AKERS confirming the amount is $1,000.00 in cash. Valuables are now in a new envelope with # [id number] [place] police will pick it up and take it back to [facility].

## 2017-08-04 NOTE — PLAN OF CARE
Problem: Social interaction  Goal: Increased social interaction  Outcome: Met This Shift  Patient has met his socialization goal for today as he has attended at least 3 groups this shift.

## 2017-08-04 NOTE — BH NOTE
Group Therapy Note    Date: 8/4/2017  Start Time: 1000  End Time:  1100  Number of Participants: 13    Type of Group: Recreational      Patient's Goal:  Increase socialization. Notes:  Social with others.     Status After Intervention:  Improved    Participation Level: Interactive    Participation Quality: Appropriate      Speech:  normal      Thought Process/Content: Logical      Affective Functioning: Congruent      Mood: euthymic      Level of consciousness:  Oriented x4      Response to Learning: Able to verbalize current knowledge/experience      Endings: None Reported    Modes of Intervention: Socialization and Activity      Discipline Responsible: Psychoeducational Specialist      Signature:  Gregor Alonzo

## 2017-08-04 NOTE — PROGRESS NOTES
Group Therapy Note    Date: 8/3/2017  Start Time: 2000  End Time:  2020  Number of Participants:     Type of Group: Wrap-Up    Wellness Binder Information  Module Name:    Session Number:      Patient's Goal: to go home and do a lot     Notes:  Goal met     Status After Intervention:  Unchanged    Participation Level:  Active Listener and Interactive    Participation Quality: Appropriate      Speech:  normal      Thought Process/Content: Logical      Affective Functioning: Congruent      Mood: flat brightens       Level of consciousness:  Alert      Response to Learning: Able to verbalize current knowledge/experience, Able to verbalize/acknowledge new learning and Able to retain information      Endings: None Reported    Modes of Intervention: Education and Support      Discipline Responsible: Licensed Practical Nurse      Signature:  Ariane Adame LPN

## 2017-08-04 NOTE — PLAN OF CARE
Problem: Health Behavior:  Goal: Ability to verbalize adaptive coping strategies to use when the urge to self-mutilate occurs will improve  Ability to verbalize adaptive coping strategies to use when the urge to self-mutilate occurs will improve   Outcome: Met This Shift  Denies urges to harm self. States likes to use breathing exercises, walk, and write a list of his priority's. Problem: Altered Mood, Depressive Behavior  Goal: LTG-Able to verbalize acceptance of life and situations over which he or she has no control  Outcome: Met This Shift  Continues to work more on acceptance. Goal: LTG-Able to verbalize and/or display a decrease in depressive symptoms  Outcome: Met This Shift  Denies being depressed. Rates his mood a 10 out of 10 with 10 being the best.  Affect flat, brightens. Out with peers until bedtime. Goal: LTG-Absence of self-harm  Outcome: Met This Shift  Pt remains safe and free from harm. Goal: STG-Knowledge of positive coping patterns  Outcome: Met This Shift  Pt states he likes to do breathing exercises, walk and write list of priority's. Goal: Patient Specific Goal  Outcome: Not Met This Shift  To go home and do a lot   Goal: Participation in care planning  Outcome: Met This Shift  Care plan reviewed with patient. Patient does verbalize understanding of the plan of care and contribute to goal setting. Problem: Falls - Risk of:  Goal: Will remain free from falls  Will remain free from falls   Outcome: Met This Shift  Remained free from falls this shift. Problem: Discharge Planning:  Goal: Discharged to appropriate level of care  Discharged to appropriate level of care   Outcome: Ongoing  Discharge planning in progress. Problem: Substance Abuse  Goal: STG-Knowledge of positive coping patterns  Outcome: Met This Shift  Pt states he likes to do breathing exercises, walk and write list of priority's.      Problem: KNOWLEDGE DEFICIT,EDUCATION,DISCHARGE PLAN  Goal: Knowledge - personal safety  Outcome: Not Met This Shift  Safety plan not completed this shift.

## 2017-08-04 NOTE — PROGRESS NOTES
Discharge [id number] the [facility] counseling department and requested a return telephone call for the purpose of scheduling follow up therapy.

## 2017-08-04 NOTE — PROGRESS NOTES
Group Therapy Note    Date: 8/4/2017  Start Time:1430  End Time:  9167  Number of Participants: 11    Type of Group: Dual recovery group    Wellness Binder Information  Module Name:  Co-Occurring disorders and recovery  Session Number:  NA    Patient's Goal:     Notes:  Pt is present for group. Provided basic education regarding dual disorders and the recovery process. Examination of denial and thinking errors. Pt minimizes and has significant denial around his use of marijuana. Pt attempts to take much of the group time justifying his use oaf marijuana to is peers.     Status After Intervention:  Improved    Participation Level: intrusive and monopolizing    Participation Quality: Attentive, Sharing and Supportive      Speech:  normal      Thought Process/Content: Logical  Linear      Affective Functioning: Congruent      Mood: euthymic      Level of consciousness:  Alert, Oriented x4 and Attentive      Response to Learning: Able to verbalize current knowledge/experience, Able to verbalize/acknowledge new learning, Able to retain information, Capable of insight, Able to change behavior and Progressing to goal      Endings: None Reported    Modes of Intervention: Education, Support, Socialization, Exploration, Clarifying and Activity      Discipline Responsible: /Counselor      Signature:  Nneka Ibarra

## 2017-08-04 NOTE — PROGRESS NOTES
Group Therapy Note    Date: 8/4/2017  Start Time: 1100  End Time:  1200  Number of Participants: 13    Type of Group: Psychotherapy    Wellness Binder Information  Module Name: Relationship group  Session Number: NA    Notes: Pt continues to attend group. Attentive with good interaction and insight  Affect and mood continue to improve. Status After Intervention:  Improved    Participation Level:  Active Listener and Interactive    Participation Quality: Appropriate, Attentive, Sharing and Supportive      Speech:  normal      Thought Process/Content: Logical  Linear      Affective Functioning: Congruent      Mood: Euthymic      Level of consciousness:  Alert, Oriented x4 and Attentive      Response to Learning: Able to verbalize current knowledge/experience, Able to verbalize/acknowledge new learning, Able to retain information, Capable of insight, Able to change behavior and Progressing to goal      Endings: None Reported    Modes of Intervention: Education, Support, Socialization, Exploration, Clarifying and Problem-solving      Discipline Responsible: /Counselor      Signature:  MARIPOSA Griffin

## 2017-08-04 NOTE — PROGRESS NOTES
Discharge planning-Left a second message at the [facility] counseling department for follow up appointment.

## 2017-08-05 PROCEDURE — 1240000000 HC EMOTIONAL WELLNESS R&B

## 2017-08-05 PROCEDURE — 6370000000 HC RX 637 (ALT 250 FOR IP): Performed by: PSYCHIATRY & NEUROLOGY

## 2017-08-05 PROCEDURE — 99231 SBSQ HOSP IP/OBS SF/LOW 25: CPT | Performed by: PSYCHIATRY & NEUROLOGY

## 2017-08-05 ASSESSMENT — PAIN SCALES - GENERAL: PAINLEVEL_OUTOF10: 0

## 2017-08-05 NOTE — PLAN OF CARE
Problem: Altered Mood, Depressive Behavior  Goal: LTG-Able to verbalize acceptance of life and situations over which he or she has no control  Outcome: Ongoing  Patient is able to verbalize acceptance of life and situations over which he or she has no control. Goal: STG-Knowledge of positive coping patterns  Outcome: Ongoing  Patient is being taught positive coping patterns. Goal: Patient Specific Goal  Outcome: Ongoing  Patient reports continuing to work towards goal.  Goal: Participation in care planning  Outcome: Ongoing    Problem: Falls - Risk of:  Goal: Will remain free from falls  Will remain free from falls   Outcome: Ongoing  Patient did not fall this evening. Encouraged use of call light to get assistance. Problem: Discharge Planning:  Goal: Discharged to appropriate level of care  Discharged to appropriate level of care   Outcome: Ongoing  Discharge planning is on going. Problem: Substance Abuse  Goal: STG-Knowledge of positive coping patterns  Outcome: Ongoing  Patient is being taught positive coping patterns. Problem: KNOWLEDGE DEFICIT,EDUCATION,DISCHARGE PLAN  Goal: Knowledge - personal safety  Outcome: Ongoing  Patient able to contract for safety. Comments:   Care plan reviewed with patient. Patient  verbalize understanding of the plan of care and contribute to goal setting.

## 2017-08-05 NOTE — BH NOTE
Group Therapy Note    Date: 8/5/2017  Start Time: 0830  End Time:  0900  Number of Participants:15    Type of Group: Community Meeting    Patient's Goal:  To stay positive always, always work on myself and get out of here    Status After Intervention:  Improved    Participation Level: Interactive    Participation Quality: Appropriate, Attentive and Sharing      Speech:  normal      Thought Process/Content: Logical      Affective Functioning: Blunted      Mood: depressed      Level of consciousness:  Alert and Attentive      Response to Learning: Able to verbalize current knowledge/experience, Able to verbalize/acknowledge new learning, Able to retain information, Capable of insight, Able to change behavior and Progressing to goal      Endings: None Reported    Modes of Intervention: Clarifying and Problem-solving      Discipline Responsible: Registered Nurse      Signature:  Cynthia Morgan RN

## 2017-08-05 NOTE — PROGRESS NOTES
Group Therapy Note    Date: 8/4/2017  Start Time: 2000  End Time:  2020    Type of Group: Wrap-Up/Relaxation    Patient's Goal:  \"go home\"    Notes:  Continues to work towards goal    Status After Intervention:  Unchanged    Participation Level:  Active Listener    Participation Quality: Appropriate      Speech:  normal      Thought Process/Content: Logical      Affective Functioning: Congruent      Mood: euthymic      Level of consciousness:  Alert and Oriented x4      Response to Learning: Able to verbalize current knowledge/experience      Endings: None Reported    Modes of Intervention: Support      Discipline Responsible: Registered Nurse      Signature:  Darell Garcia RN

## 2017-08-05 NOTE — PLAN OF CARE
Problem: Health Behavior:  Goal: Ability to verbalize adaptive coping strategies to use when the urge to self-mutilate occurs will improve  Ability to verbalize adaptive coping strategies to use when the urge to self-mutilate occurs will improve   Outcome: Completed Date Met:  08/04/17  Denies thoughts to self mutilate    Problem: Altered Mood, Depressive Behavior  Goal: LTG-Able to verbalize acceptance of life and situations over which he or she has no control  Outcome: Ongoing  Continues to work towards accepting life situations over which he has no control. Goal: LTG-Able to verbalize and/or display a decrease in depressive symptoms  Outcome: Completed Date Met:  08/04/17  Rates mood a 10/10  Goal: LTG-Absence of self-harm  Outcome: Completed Date Met:  08/04/17  No self harm thoughts or behaviors. Goal: STG-Knowledge of positive coping patterns  Outcome: Ongoing  Attending groups to gain new positive coping skills. Goal: Patient Specific Goal  Outcome: Ongoing  Set a daily goal to go home. Goal: Participation in care planning  Outcome: Ongoing  Care plan reviewed with patient. Patient verbalize understanding of the plan of care and contribute to goal setting. Problem: Falls - Risk of:  Goal: Will remain free from falls  Will remain free from falls   Outcome: Ongoing  No falls this shift. Problem: Discharge Planning:  Goal: Discharged to appropriate level of care  Discharged to appropriate level of care   Outcome: Ongoing  Plans to return home after discharge. Problem: Substance Abuse  Goal: STG-Knowledge of positive coping patterns  Outcome: Ongoing  Attending groups to gain new positive coping skills. Problem: KNOWLEDGE DEFICIT,EDUCATION,DISCHARGE PLAN  Goal: Knowledge - personal safety  Outcome: Ongoing  Encouraged to complete a safety plan prior to discharge.

## 2017-08-05 NOTE — PROGRESS NOTES
Department of Psychiatry  Attending Physician Progress Note    Chief Complaint: SI    SUBJECTIVE:  Pt stated that he is \"doing great\". He stated that his GF visited him yesterday, and he is studying for the exam. He showed me his books as well. He stated that he has been attending groups and they are helping him a lot. Option of meds discussed again and he still doesn't want to take any medication. He stated that he has learned \"lot of coping skills\" during his stay. After discharge he wants to look for a job. He reports adequate sleep and appetite. He denies any depressive, manic or anxiety symptoms, perceptual disturbances or suicidal/homicidal ideation, intent or plan.  He denies any active physical complaints and is not in any apparent distress    OBJECTIVE    Physical  VITALS:    /72  Pulse 65  Temp 98.1 °F (36.7 °C)  Resp 16  Ht 6' 1\" (1.854 m)  Wt 149 lb (67.6 kg)  SpO2 96%  BMI 19.66 kg/m2    Mental Status Examination:    Level of consciousness:  Within normal limits  Appearance: Street clothes, seated, with good grooming  Behavior/Motor: No abnormalities noted  Attitude toward examiner:  Cooperative, attentive, good eye contact  Speech:  spontaneous, normal rate, normal volume and well articulated  Mood:  \"fine\"  Affect:  Full range, stable  Thought processes:  linear, goal directed and coherent  Thought content:  denies homicidal ideation  Suicidal Ideation:  denies suicidal ideation  Delusions:  no evidence of delusions  Perceptual Disturbance:  denies any perceptual disturbance  Cognition:  In tact  Memory: age appropriate  Insight & Judgement: fair  Medication side effects:  denies     Medications  Current Facility-Administered Medications: acetaminophen (TYLENOL) tablet 650 mg, 650 mg, Oral, Q4H PRN  hydrOXYzine (VISTARIL) capsule 25 mg, 25 mg, Oral, TID PRN  traZODone (DESYREL) tablet 50 mg, 50 mg, Oral, Nightly PRN  magnesium hydroxide (MILK OF MAGNESIA) 400 MG/5ML suspension 30 mL,

## 2017-08-05 NOTE — PROGRESS NOTES
Group Therapy Note    Date: 8/5/2017  Start Time: 1630  End Time:  1700  Number of Participants: 8    Type of Group: Healthy Living/Wellness    Notes:   Discussed and read a handout about listening. Status After Intervention:  Unchanged    Participation Level:  Active Listener    Participation Quality: Appropriate      Speech:  normal      Thought Process/Content: Logical      Affective Functioning: Congruent      Level of consciousness:  Alert and Oriented x4      Response to Learning: Able to verbalize current knowledge/experience      Endings: None Reported    Modes of Intervention: Support and Socialization      Discipline Responsible: Registered Nurse      Signature:  Harrie Sandifer, RN

## 2017-08-06 PROCEDURE — 99231 SBSQ HOSP IP/OBS SF/LOW 25: CPT | Performed by: NURSE PRACTITIONER

## 2017-08-06 PROCEDURE — 6370000000 HC RX 637 (ALT 250 FOR IP): Performed by: PSYCHIATRY & NEUROLOGY

## 2017-08-06 PROCEDURE — 1240000000 HC EMOTIONAL WELLNESS R&B

## 2017-08-06 RX ADMIN — MAGNESIUM HYDROXIDE 30 ML: 400 SUSPENSION ORAL at 13:04

## 2017-08-06 ASSESSMENT — PAIN SCALES - GENERAL: PAINLEVEL_OUTOF10: 0

## 2017-08-06 NOTE — BH NOTE
Start Time 1300   End Time  1330    Group Topic:  Coping skills    Group Type:                                                     Intervention Method:     Activity                                 Interactive game           Attendance  Attended    AFFECT/MOOD:  Neutral/Euthymic(normal)    THOUGHT PROCESS:  Goal-Directed    BEHAVIOR  Cooperative and Pleasant    INTERACTION/SOCIALIZATION:  Responds Appropriately    Response to Intervention:  Progressing toward goal(s)

## 2017-08-06 NOTE — PLAN OF CARE
5 [facility]  Day 3 Interdisciplinary Treatment Plan NOTE    Review Date & Time: August 6. 2017    Patient was in treatment team    Admission Type:   Admission Type: Involuntary    Reason for admission:  Reason for Admission: MDD  Estimated Length of Stay Update:  3-5 Days  Estimated Discharge Date Update: TBD    PATIENT STRENGTHS:  Patient Strengths Strengths: Communication, Social Skills, Motivated, No significant Physical Illness, Positive Support  Patient Strengths and Limitations:Limitations: Difficult relationships / poor social skills, Inappropriate/potentially harmful leisure interests  Addictive Behavior:Addictive Behavior  In the past 3 months, have you felt or has someone told you that you have a problem with:  : None  Do you have a history of Chemical Use?: No  Do you have a history of Alcohol Use?: No  Do you have a history of Street Drug Abuse?: Yes  Histroy of Prescripton Drug Abuse?: No  Medical Problems:History reviewed. No pertinent past medical history. Risk:  Fall RiskTotal: 67  Luis Scale Luis Scale Score: 22  BVC Total: 0  Change in scores: See Nursing Assessment. Changes to plan of Care: See Nursing Assessment.      Status EXAM:   Status and Exam  Normal: Yes  Facial Expression: Brightened  Affect: Appropriate  Level of Consciousness: Alert  Mood:Normal: Yes  Mood: Anxious  Motor Activity:Normal: Yes  Motor Activity: Increased  Interview Behavior: Cooperative  Preception: [place] to Person, [place] to Time, [place] to Place, [place] to Situation  Attention:Normal: Yes  Attention: Hyperalert  Thought Processes: Circumstantial  Thought Content:Normal: Yes  Thought Content: Ideas of Influence, Paranoia  Hallucinations: None  Delusions: No  Memory:Normal: Yes  Memory: Poor Recent, Poor Remote  Insight and Judgment: No  Insight and Judgment: Poor Judgment, Poor Insight  Present Suicidal Ideation: No  Present Homicidal Ideation: No    Daily Assessment Last Entry:   Daily Sleep (WDL): Within Defined Limits         Patient Currently in Pain: Denies  Daily Nutrition (WDL): Within Defined Limits    Patient Monitoring:  Frequency of Checks: 4 times per hour, close    Psychiatric Symptoms:   Depression Symptoms  Depression Symptoms: No problems reported or observed. Anxiety Symptoms  Anxiety Symptoms: No problems reported or observed. Carolin Symptoms  Carolin Symptoms: No problems reported or observed. Psychosis Symptoms  Delusion Type: No problems reported or observed. Suicide Risk CSSR-S:  1) Within the past month, have you wished you were dead or wished you could go to sleep and not wake up? : YES  2) Within the past month, have you actually had any thoughts of killing yourself? : YES  3) Within the past month, have you been thinking about how you might kill yourself? : YES  5) Within the past month, have you started to work out or worked out the details of how to kill yourself? Do you intend to carry out this plan? : YES  6)  Have you ever done anything, started to do anything, or prepared to do anything to end your life?: YES  Change in Result: See Nursing Assessment. Change in Plan of care: See Nursing Assessment. EDUCATION:   Learner Progress Toward Treatment Goals: Reviewed goals and plan of care    Method: Group    Outcome: Verbalized understanding    PATIENT GOALS:  1. How are you progressing toward meeting your main treatment goal? Pt reports that he is doing well, pt enjoys the groups and is getting along with the other patients. Pt feels better about his coping skills. 2. Are there discharge barriers/lingering problems that need to be addressed? Pt states that he does not feel that he has a lot of barriers, and that he will contact a counselor at [facility] when he begins classes. Pt feels that he is better equipped to get through school. 3. How is your group participation? Pt has participated in most if not all group therapy.     PLAN/TREATMENT RECOMMENDATIONS UPDATE: medication management, recreational therapy, nursing education therapy, psychiatry therapy.      SHORT-TERM GOALS UPDATE:   Time frame for Short-Term Goals: 3 to 5 days    LONG-TERM GOALS UPDATE:   Time frame for Long-Term Goals: Ongoing  Members Present in Team Meeting: See Signature Sheet    MARIPOSA Brady

## 2017-08-06 NOTE — PLAN OF CARE
Problem: Altered Mood, Depressive Behavior  Goal: LTG-Able to verbalize acceptance of life and situations over which he or she has no control  Outcome: Ongoing  Patient working toward verbalizing situations which he has no control over. Goal: STG-Knowledge of positive coping patterns  Outcome: Met This Shift  Patient attending groups and learning new positive coping skills. Goal: Patient Specific Goal  Outcome: Met This Shift  Patient able to make goals. Goal: Participation in care planning  Outcome: Met This Shift  Patient participates in care planning. Problem: Falls - Risk of:  Goal: Will remain free from falls  Will remain free from falls   Outcome: Met This Shift  Patient remains free from falls so far this evening. Gait steady. Problem: Discharge Planning:  Goal: Discharged to appropriate level of care  Discharged to appropriate level of care   Outcome: Ongoing  Discharge plans in progress. Problem: Substance Abuse  Goal: STG-Knowledge of positive coping patterns  Outcome: Met This Shift  Patient attending groups and learning new positive coping skills. Comments:   Care plan reviewed with patient. Patient does verbalize understanding of the plan of care and does contribute to goal setting.

## 2017-08-06 NOTE — BH NOTE
Marcelle RIVERA has reviewed and agrees with Stella Cabral LPN's shift   Assessment.     BERTRAM ALMENDAREZ  8/6/2017

## 2017-08-06 NOTE — PROGRESS NOTES
Psychiatry Progress Note        8-6-2017                    HPI:  Anatoliy Mantilla is a 23 y.o. male, a college student who was brought in because of suicidal ideation, with intent and plan of hanging himself with a cord after argument with his GF     Pt stated that he as having conflicts with his GF as she was talking to a pat that he didn't like. He stated that he had an argument, after that he locked the door of the dorm, started yelling and screaming that he is going to kill himself. He stated that college staff called [phone], and he told 911 staff that he was thinking of hanging himself with a cord. He was pink slipped.     Pt seemed to be minimizing his symptoms, he stated that what he did was \"out of anger\" and \"it was a stupid thing\" and he didn't intent to harm himself. He stated that for last few weeks he had been feeling stressed because of conflicts with his GF, stress at school and financial stress, though he denies any psychiatric symptoms including depressive, manic or anxiety symptoms, perceptual disturbances or suicidal/homicidal ideation, intent or plan. He was preoccupied with discharge, stating that he has learned \"lot of coping skills\" during his stay in the hospital. Treatment options including medications discussed with the pt, with benefits, risks and treatment alternatives in detail, he kept on refusing to take any meds and stating that he doesn't need them and will only follow with therapy. He denies any active physical complaints and is not in any apparent distress     Progress:  Julio Tate reports feeling better since admission. Denies feelings of self harm. Reports depression is much less. Feels meds are working well. Denies side effects. Good med compliance is reported. Reports appetite is good and is sleeping Verified slept. 6.5 hours continuous. States he attends groups. States his girlfriend visited yesterday and visit went well.      MSE:  Level of consciousness: Alert  Appearance: hospital attire, in chair and fair grooming   Behavior/Motor: no abnormalities noted   Attitude toward examiner: cooperative   Speech: Normal volume, goal directed, NRR  Mood: Euthymic  Affect: Reactive  Thought processes: Linear and goal directed   Suicidal Ideation: Denies suicidal ideations  Homicidal ideation: Denies homicidal ideations  Delusions: No evidence of delusions is observed  Perceptual Disturbance: Denies AH/VH;  No evidence of psychosis is observed. 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       Teresa Delacruz CNP   [id number]

## 2017-08-06 NOTE — BH NOTE
Group Therapy Note     Date: 8/6/2017  Start Time: 1500  End Time:  0524  Number of Participants: 15     Type of Group: Psychoeducation        Goal of Group:  Educate on self esteem, where it comes from, who has affected it, how to change it and the importance of self esteem on their recovery and resiliency.       Patient was able to give a positive self trait that he is a \"good \", praise given for same.      Status After Intervention:  Improved     Participation Level:  Active Listener and good participation in the group discussion.     Participation Quality: Appropriate and Attentive        Speech:  normal        Thought Process/Content: Logical        Affective Functioning: Flat and Constricted/Restricted        Mood: depressed        Level of consciousness:  Alert, Oriented x4, Attentive         Response to Learning: Able to verbalize current knowledge/experience, Able to verbalize/acknowledge new learning, Able to retain information, Capable of insight and Able to change behavior        Endings: None Reported     Modes of Intervention: Education, Support and Socialization        Discipline Responsible: Registered Nurse        Signature:  BERTRAM Dias RN MSN

## 2017-08-06 NOTE — PROGRESS NOTES
Group Therapy Note    Date: 8/5/2017  Start Time: 2030  End Time:  2045  Number of Participants:     Type of Group: Wrap-Up    Wellness Binder Information  Module Name:    Session Number:      Patient's Goal:     Notes:      Status After Intervention:  Improved    Participation Level:  Active Listener and Interactive    Participation Quality: Appropriate, Attentive and Sharing      Speech:  normal      Thought Process/Content: Logical      Affective Functioning: Congruent      Mood: GOOD      Level of consciousness:  Alert and Oriented x4      Response to Learning: Able to verbalize/acknowledge new learning and Capable of insight      Endings: None Reported    Modes of Intervention: Support      Discipline Responsible: Behavorial Health Tech      Signature:  Carri Zavala

## 2017-08-06 NOTE — PLAN OF CARE
Problem: Altered Mood, Depressive Behavior  Goal: LTG-Able to verbalize acceptance of life and situations over which he or she has no control  Outcome: Ongoing  Patient working towards acceptance of his life situations. Goal: Patient Specific Goal  Outcome: Ongoing  \"shower,eat\"  Goal: Participation in care planning  Outcome: Ongoing  Care plan reviewed with patient. Patient does verbalize understanding of the plan of care and does contribute to goal setting    Problem: Falls - Risk of:  Goal: Will remain free from falls  Will remain free from falls   Outcome: Ongoing  Free from falls. Gait is steady. Problem: Discharge Planning:  Goal: Discharged to appropriate level of care  Discharged to appropriate level of care   Outcome: Ongoing  Discharge planning in process.

## 2017-08-06 NOTE — PROGRESS NOTES
PSYCHOEDUCATION GROUP NOTE       Date:  August 6, 2017                Start Time:  10:15 AM                       End Time:  12:05 PM      Number Participants in Group: 10      Name of group: Coping Skills        RT XX SW  Nsg  LPN   BHTII  Other       Participation Level:     None  Minimal   XX Active Listener XX Interactive    Monopolizing         Participation Quality:  XX Appropriate  Inappropriate   XX  Attentive   Intrusive   XX  Sharing   Resistant   XX  Supportive    Lethargic       Affective:   XX Congruent  Incongruent  Blunted  Flat    Constricted  Anxious  Elated  Angry    Labile  Depressed  Other         Cognitive:  XX Alert XX Oriented PPTS     Concentration G XX F  P    Attention Span G XX F  P    Short-Term Memory G XX F  P    Long-Term Memory G XX F  P    ProblemSolving/  Decision Making G XX F  P    Ability to Process  Information G XX F  P       Contributing Factors             Delusional             Hallucinating             Flight of Ideas             Other: poor concentration       Modes of Intervention:  X Education X Support X Exploration    Clarifying X Problem Solving  Confrontation   X Socialization  Limit Setting  Reality Testing    Activity  Movement X Media    Other:          Response to Learning:  XX Able to verbalize current knowledge/experience    Able to verbalize/acknowledge new learning    Able to retain information    Capable of insight    Able to change behavior   XX Progressing to goal    Other:        Comments:

## 2017-08-06 NOTE — PROGRESS NOTES
PSYCHOEDUCATION GROUP NOTE       Date:  August 5, 2017                Start Time:  10:45 AM                     End Time:  12:00 PM      Number Participants in Group: 15      Name of group: Coping Skills        RT XX SW  Nsg  LPN   BHTII  Other       Participation Level:     None  Minimal   X Active Listener X Interactive    Monopolizing         Participation Quality:  X Appropriate  Inappropriate   X  Attentive   Intrusive   X  Sharing   Resistant   X  Supportive    Lethargic       Affective:    Congruent  Incongruent  Blunted  Flat    Constricted  Anxious  Elated  Angry    Labile  Depressed  Other         Cognitive:  X Alert X Oriented PPTS     Concentration G X F  P    Attention Span G X F  P    Short-Term Memory G X F  P    Long-Term Memory G X F  P    ProblemSolving/  Decision Making G X F  P    Ability to Process  Information G X F  P       Contributing Factors             Delusional             Hallucinating             Flight of Ideas             Other: poor concentration       Modes of Intervention:  X Education X Support X Exploration    Clarifying X Problem Solving  Confrontation   X Socialization  Limit Setting  Reality Testing    Activity  Movement X Media    Other:          Response to Learning:  X Able to verbalize current knowledge/experience   X Able to verbalize/acknowledge new learning    Able to retain information    Capable of insight    Able to change behavior   X Progressing to goal    Other:        Comments:

## 2017-08-07 VITALS
RESPIRATION RATE: 20 BRPM | SYSTOLIC BLOOD PRESSURE: 135 MMHG | TEMPERATURE: 96.9 F | DIASTOLIC BLOOD PRESSURE: 63 MMHG | HEIGHT: 73 IN | OXYGEN SATURATION: 98 % | WEIGHT: 149 LBS | BODY MASS INDEX: 19.75 KG/M2 | HEART RATE: 78 BPM

## 2017-08-07 PROCEDURE — 99238 HOSP IP/OBS DSCHRG MGMT 30/<: CPT | Performed by: NURSE PRACTITIONER

## 2017-08-07 PROCEDURE — 5130000000 HC BRIDGE APPOINTMENT

## 2017-08-07 NOTE — PROGRESS NOTES
Group Therapy Note    Date: 8/6/2017  Start Time: 2030  End Time:  2045  Number of Participants:     Type of Group: Wrap-Up    Wellness Binder Information  Module Name:    Session Number:      Patient's Goal:     Notes:  MET, EAT AND SHOWER    Status After Intervention:  Improved    Participation Level:  Active Listener and Interactive    Participation Quality: Appropriate, Attentive and Sharing      Speech:  normal      Thought Process/Content: Logical      Affective Functioning: Congruent      Mood: GREAT      Level of consciousness:  Alert and Oriented x4      Response to Learning: Capable of insight and Progressing to goal      Endings: None Reported    Modes of Intervention: Education, Support and Socialization      Discipline Responsible: Behavorial Health Tech      Signature:  Carri Fletcher

## 2017-08-07 NOTE — PROGRESS NOTES
[facility]  Discharge Note    Pt discharged with followings belongings:   Dentures: None  Vision - Corrective Lenses: None  Hearing Aid: None  Jewelry: None  Body Piercings Removed: N/A  Clothing: Footwear, Pants, Shirt  Were All Patient Medications Collected?: Not Applicable  Other Valuables: Cell phone, Beto Haymaker, Money (Comment), Other (Comment) (cigarettes, lighter)   Valuables sent home with patient. Valuables retrieved from safe, Security envelope number:  [id number] and returned to patient. Patient left department with Departure Mode: With spouse via Mobility at Departure: Ambulatory, discharged to Discharged to: Private Residence. Patient education on aftercare instructions: yes  Bridge Appointment completed: Reviewed Discharge Instructions with patient. Patient verbalizes understanding and agreement with the discharge plan using the teachback method. Patient verbalize understanding of AVS:  yes.     Status EXAM upon discharge:  Status and Exam  Normal: Yes  Facial Expression: Brightened  Affect: Appropriate  Level of Consciousness: Alert  Mood:Normal: Yes  Mood: Anxious  Motor Activity:Normal: Yes  Motor Activity: Increased  Interview Behavior: Cooperative  Preception: [place] to Person, [place] to Time, [place] to Place, [place] to Situation  Attention:Normal: Yes  Attention: Hyperalert  Thought Processes: Circumstantial  Thought Content:Normal: Yes  Thought Content: Ideas of Influence, Paranoia  Hallucinations: None  Delusions: No  Memory:Normal: Yes  Memory: Poor Recent, Poor Remote  Insight and Judgment: No  Insight and Judgment: Poor Judgment, Unrealistic  Present Suicidal Ideation: No  Present Homicidal Ideation: No    Sandhya Smith LPN

## 2017-08-07 NOTE — PLAN OF CARE
Problem: Altered Mood, Depressive Behavior  Goal: LTG-Able to verbalize acceptance of life and situations over which he or she has no control  Outcome: Met This Shift  Continues to work towards accepting life situations over which he has no control. Goal: Patient Specific Goal  Outcome: Met This Shift  Reports his goal today was to shower and he did. Goal: Participation in care planning  Outcome: Met This Shift  Patient is able to participate in care planning. Problem: Falls - Risk of:  Goal: Will remain free from falls  Will remain free from falls   Outcome: Met This Shift  Free of falls this shift. Fall protocol in place. Up with gripper socks on for safe ambulation. Problem: Discharge Planning:  Goal: Discharged to appropriate level of care  Discharged to appropriate level of care   Outcome: Ongoing  Patient will be discharged to safe, appropriate level of care. Patient will work with interdisciplinary team towards meeting discharge needs. Comments:   Care plan reviewed with patient. Patient does verbalize understanding of the plan of care and did contribute to goal setting.

## 2017-08-07 NOTE — DISCHARGE SUMMARY
is not in any apparent distress    Hospital Course:   Upon admission, Jasmin Rhodes was provided a safe secure environment, introduced to unit milieu and he participated in groups and individual therapies. Meds were started but he indicated that he did not want to take any medications because he did not need them. After few days of hospital care, patient has shown to have some improvement. On morning rounds 8/7/2017, he stated he feels ready for discharge. Reports that he \"feels great\", rates his mood to be a 10/10, depression lifted, thoughts to harm self ceased. Sleep improved and appetite is good. He is hopeful, goal and future oriented and will be seeing a therapist at school. He also will be following at [facility]. Patient wants to return to school write his exams since he missed those because of his admission. Says he has learned a lot being here and sees his admission as an eye opener. Met a lot of people here going through a lot more than him and is greatfull for his life and all the support he has. It was decided that pt had achieved maximum benefit from hospital care and can be discharged back to his school. Consults:   None      Significant Diagnostic Studies: Routine labs and diagnostics    Treatments: Psychotropic medications, therapy with group, milieu, and 1:1 with nurses, social workers, PA-C/CNP, and Attending physician. There are no discharge medications for this patient.       Discharge Exam:  Level of consciousness:  within normal limits  Appearance:  well-appearing, street clothes, in chair, good grooming and good hygiene  Behavior/Motor:  no abnormalities noted  Attitude toward examiner:  cooperative, attentive and good eye contact  Speech:  normal rate, normal volume and well articulated  Mood:  Euthymic  Affect:  Reactive  Thought processes:  linear, goal directed and coherent  Thought content:  Homicidal ideation denies  Suicidal Ideation:  denies suicidal ideation  Delusions:  no evidence of delusions  Perceptual Disturbance:  denies any perceptual disturbance  Cognition:  oriented to person, place, and time  Concentration succeeded  Memory intact  Insight & Judgment:  Improved    Disposition: home    Patient Instructions: Activity: activity as tolerated    Follow-up as scheduled with Therapist/French's     Time Spent: 21 mins    Engagement: Patient displayed a good level of engagement with the treatments offered during this admission. Signed:  Yamel Hugo DNP  8/7/2017  10:31 AM     Pt seen with NP. He reports much improvement in his psychiatric symptoms. He currently denies any depressive, manic or anxiety symptoms, perceptual diturbances or suicidal/homicidal ideation, intent or plan. He is treatment seeking , future oriented and doesn't pose danger to self or others.  He is stable to be discharged

## 2017-08-07 NOTE — PROGRESS NOTES
Spiritual Support Group Note    Number of Participants in Group: 12                               Time: 7:30 pm    Goal: Relief from isolation and loneliness             Robyn Sharing             Self-understanding and gain insight              Acceptance and belonging            Recognize they are not alone                Socialization             Empowerment       Encouragement    Topic:  [] Spiritual Wellness and Self Care                  [] Hope                     [] Connecting with Divine/Others        [] Thankfulness and Gratitude               []  Meaningfulness and Purpose               [] Forgiveness               [] Peace               [] Connect to [employer]     [] Other:    Participation Level:   [] Active Listener   [] Minimal   [] Monopolizing   [] Interactive   [] No Participation   []  Other:     Attention:   [] Alert   [] Distractible   [] Drowsy   [] Poor   [] Other:    Manner:   [] Cooperative   [] Suspicious   [] Withdrawn   [] Guarded   [] Irritable   [] Inhospitable   [] Other:     Others Comments from Group:     Pt attended Hindu service

## 2017-11-11 ENCOUNTER — HOSPITAL ENCOUNTER (EMERGENCY)
Age: 22
Discharge: HOME | End: 2017-11-11
Attending: EMERGENCY MEDICINE
Payer: MEDICAID

## 2017-11-11 VITALS
OXYGEN SATURATION: 98 % | BODY MASS INDEX: 20.54 KG/M2 | SYSTOLIC BLOOD PRESSURE: 109 MMHG | HEIGHT: 73 IN | HEART RATE: 83 BPM | DIASTOLIC BLOOD PRESSURE: 72 MMHG | RESPIRATION RATE: 16 BRPM | TEMPERATURE: 97.9 F | WEIGHT: 155 LBS

## 2017-11-11 DIAGNOSIS — K64.5 EXTERNAL THROMBOSED HEMORRHOIDS: Primary | ICD-10-CM

## 2017-11-11 PROCEDURE — 99283 EMERGENCY DEPT VISIT LOW MDM: CPT

## 2017-11-11 ASSESSMENT — ENCOUNTER SYMPTOMS
SORE THROAT: 0
EYE REDNESS: 0
CONSTIPATION: 1
NAUSEA: 0
BACK PAIN: 0
RHINORRHEA: 0
DIARRHEA: 0
SHORTNESS OF BREATH: 0
ABDOMINAL PAIN: 0
COUGH: 0
WHEEZING: 0
EYE DISCHARGE: 0
VOMITING: 0

## 2017-11-11 ASSESSMENT — PAIN DESCRIPTION - DESCRIPTORS: DESCRIPTORS: ACHING;BURNING

## 2017-11-11 ASSESSMENT — PAIN DESCRIPTION - ORIENTATION: ORIENTATION: MID

## 2017-11-11 ASSESSMENT — PAIN DESCRIPTION - PAIN TYPE: TYPE: ACUTE PAIN

## 2017-11-11 ASSESSMENT — PAIN SCALES - GENERAL: PAINLEVEL_OUTOF10: 10

## 2017-11-11 ASSESSMENT — PAIN DESCRIPTION - LOCATION: LOCATION: BUTTOCKS

## 2017-11-11 NOTE — ED PROVIDER NOTES
[address] [id number]   eMERGENCY dEPARTMENT eNCOUnter        CHIEF COMPLAINT    Chief Complaint   Patient presents with    Abscess       Nurses Notes reviewed and I agree except as noted in the HPI. HPI    Dinorah Acosta is a 23 y.o. male who presents for evaluation of rectal pain. The patient reports that a few days ago, he noticed pain with bowel movements that has gradually gotten worse. He says that felt something that felt like an abscess. He states that he is not normally constipated, but was the day he noticed the pain. He rates the pain at a 10/10 in severity, and describes it as aching and burning in character. Patient denies any further complaints at initial encounter. REVIEW OF SYSTEMS    Review of Systems   Constitutional: Negative for appetite change, chills, fatigue and fever. HENT: Negative for congestion, ear pain, rhinorrhea and sore throat. Eyes: Negative for discharge, redness and visual disturbance. Respiratory: Negative for cough, shortness of breath and wheezing. Cardiovascular: Negative for chest pain, palpitations and leg swelling. Gastrointestinal: Positive for constipation. Negative for abdominal pain, diarrhea, nausea and vomiting. Genitourinary: Negative for decreased urine volume, difficulty urinating and dysuria. Musculoskeletal: Negative for arthralgias, back pain, joint swelling and neck pain. Skin: Negative for pallor and rash. Allergic/Immunologic: Negative for environmental allergies. Neurological: Negative for dizziness, syncope, weakness, light-headedness and headaches. Hematological: Negative for adenopathy. Psychiatric/Behavioral: Negative for agitation, confusion, dysphoric mood and suicidal ideas. The patient is not nervous/anxious. PAST MEDICAL HISTORY     has no past medical history on file. SURGICAL HISTORY     has no past surgical history on file.     CURRENT MEDICATIONS    There are no discharge medications for this patient. ALLERGIES    has No Known Allergies. FAMILY HISTORY    indicated that his mother is alive. He indicated that his father is alive. He indicated that his brother is alive. family history is not on file. SOCIAL HISTORY     reports that he has been smoking. He has a 9.00 pack-year smoking history. He has never used smokeless tobacco. He reports that he uses drugs, including Marijuana. He reports that he does not drink alcohol. PHYSICAL EXAM      INITIAL VITALS: /72   Pulse 83   Temp 97.9 °F (36.6 °C) (Oral)   Resp 16   Ht 6' 1\" (1.854 m)   Wt 155 lb (70.3 kg)   SpO2 98%   BMI 20.45 kg/m²  Estimated body mass index is 20.45 kg/m² as calculated from the following:    Height as of this encounter: 6' 1\" (1.854 m). Weight as of this encounter: 155 lb (70.3 kg). Physical Exam   Constitutional: He is oriented to person, place, and time. He appears well-developed and well-nourished. HENT:   Head: Normocephalic and atraumatic. Right Ear: External ear normal.   Left Ear: External ear normal.   Eyes: Conjunctivae are normal. Right eye exhibits no discharge. Left eye exhibits no discharge. No scleral icterus. Neck: Normal range of motion. Neck supple. No JVD present. Pulmonary/Chest: Effort normal. No stridor. No respiratory distress. Abdominal: Soft. He exhibits no distension. There is no tenderness. There is no rebound and no guarding. Genitourinary:   Genitourinary Comments: 1.5 cm tender external hemorrhoid. No cellulitis. Musculoskeletal: Normal range of motion. He exhibits no edema. Neurological: He is alert and oriented to person, place, and time. He exhibits normal muscle tone. GCS eye subscore is 4. GCS verbal subscore is 5. GCS motor subscore is 6. Skin: Skin is warm and dry. He is not diaphoretic. No erythema. Psychiatric: He has a normal mood and affect. His behavior is normal.   Nursing note and vitals reviewed.       MEDICAL DECISION MAKING    DIFFERENTIAL DIAGNOSIS:  Hemorrhoid, rectal abscess, fissures      DIAGNOSTIC RESULTS    None      Vitals:    Vitals:    11/11/17 1229   BP: 109/72   Pulse: 83   Resp: 16   Temp: 97.9 °F (36.6 °C)   TempSrc: Oral   SpO2: 98%   Weight: 155 lb (70.3 kg)   Height: 6' 1\" (1.854 m)       EMERGENCY DEPARTMENT COURSE:    Medications - No data to display    The pt was seen and evaluated by me. Within the department, I observed the pt's vital signs to be within acceptable range. I observed the pt's condition to remain stable during the duration of their stay. I explained my proposed course of treatment to the pt, and they were amenable to my decision. They were discharged home, and they will return to the ED if their symptoms become more severe in nature, or otherwise change. CRITICAL CARE:   None. CONSULTS:  None    PROCEDURES:  None. FINAL IMPRESSION       1. External thrombosed hemorrhoids          DISPOSITION/PLAN  PATIENT REFERRED TO:  [facility]    In 1 week      DISCHARGE MEDICATIONS:    Proctofoam HC applied 2-3 times daily after hot sitz bath      (Please note that portions of this note were completed with a voice recognition program.  Efforts were made to edit the dictations but occasionally words are mis-transcribed.)      Scribe:  Ashley Pop   11/11/17 1:16 PM Scribing for and in the presence of Anne Montoya M.D. Signed by: Cherelle Olmedo, 11/11/17 7:03 PM    Provider:  I personally performed the services described in the documentation, reviewed and edited the documentation which was dictated to the scribe in my presence, and it accurately records my words and actions.      11/11/17 7:03 PM    Mercedes Silva MD      Emergency room physician              Mercedes Silva MD  11/11/17 1986

## 2017-11-11 NOTE — ED NOTES
Presents with complaints of possible cyst or Hemorid. States that it just hurts \" on my butthole \".      Tabitha Sepulveda RN  11/11/17 7188